# Patient Record
Sex: MALE | URBAN - METROPOLITAN AREA
[De-identification: names, ages, dates, MRNs, and addresses within clinical notes are randomized per-mention and may not be internally consistent; named-entity substitution may affect disease eponyms.]

---

## 2024-05-16 ENCOUNTER — APPOINTMENT (RX ONLY)
Dept: URBAN - METROPOLITAN AREA CLINIC 77 | Facility: CLINIC | Age: 59
Setting detail: DERMATOLOGY
End: 2024-05-16

## 2024-05-16 DIAGNOSIS — L81.4 OTHER MELANIN HYPERPIGMENTATION: ICD-10-CM

## 2024-05-16 DIAGNOSIS — L57.8 OTHER SKIN CHANGES DUE TO CHRONIC EXPOSURE TO NONIONIZING RADIATION: ICD-10-CM

## 2024-05-16 DIAGNOSIS — D22 MELANOCYTIC NEVI: ICD-10-CM

## 2024-05-16 DIAGNOSIS — Z71.89 OTHER SPECIFIED COUNSELING: ICD-10-CM

## 2024-05-16 DIAGNOSIS — L85.3 XEROSIS CUTIS: ICD-10-CM

## 2024-05-16 PROBLEM — D22.71 MELANOCYTIC NEVI OF RIGHT LOWER LIMB, INCLUDING HIP: Status: ACTIVE | Noted: 2024-05-16

## 2024-05-16 PROBLEM — D22.5 MELANOCYTIC NEVI OF TRUNK: Status: ACTIVE | Noted: 2024-05-16

## 2024-05-16 PROBLEM — D23.61 OTHER BENIGN NEOPLASM OF SKIN OF RIGHT UPPER LIMB, INCLUDING SHOULDER: Status: ACTIVE | Noted: 2024-05-16

## 2024-05-16 PROCEDURE — ? SUNSCREEN RECOMMENDATIONS

## 2024-05-16 PROCEDURE — ? EDUCATIONAL RESOURCES PROVIDED

## 2024-05-16 PROCEDURE — ? OBSERVATION AND MEASURE

## 2024-05-16 PROCEDURE — ? COUNSELING

## 2024-05-16 PROCEDURE — 99204 OFFICE O/P NEW MOD 45 MIN: CPT

## 2024-05-16 PROCEDURE — ? PRESCRIPTION SAMPLES PROVIDED

## 2024-05-16 ASSESSMENT — LOCATION DETAILED DESCRIPTION DERM
LOCATION DETAILED: EPIGASTRIC SKIN
LOCATION DETAILED: RIGHT MEDIAL PLANTAR MIDFOOT
LOCATION DETAILED: RIGHT LATERAL SUPERIOR CHEST

## 2024-05-16 ASSESSMENT — LOCATION SIMPLE DESCRIPTION DERM
LOCATION SIMPLE: CHEST
LOCATION SIMPLE: RIGHT PLANTAR SURFACE
LOCATION SIMPLE: ABDOMEN

## 2024-05-16 ASSESSMENT — LOCATION ZONE DERM
LOCATION ZONE: FEET
LOCATION ZONE: TRUNK

## 2024-05-16 NOTE — HPI: SKIN LESION
Is This A New Presentation, Or A Follow-Up?: Moles
What Type Of Note Output Would You Prefer (Optional)?: Standard Output
Additional History: Pt here to establish baseline skin exam with new dermatologist. Because of his family history pt here to have his skin eased to make sure no cancerous
Which Family Member (Optional)?: Brother and sister